# Patient Record
Sex: MALE | Race: WHITE | NOT HISPANIC OR LATINO | Employment: FULL TIME | URBAN - METROPOLITAN AREA
[De-identification: names, ages, dates, MRNs, and addresses within clinical notes are randomized per-mention and may not be internally consistent; named-entity substitution may affect disease eponyms.]

---

## 2018-05-16 ENCOUNTER — OFFICE VISIT (OUTPATIENT)
Dept: URGENT CARE | Facility: PHYSICIAN GROUP | Age: 23
End: 2018-05-16
Payer: COMMERCIAL

## 2018-05-16 VITALS
SYSTOLIC BLOOD PRESSURE: 118 MMHG | HEIGHT: 68 IN | HEART RATE: 75 BPM | OXYGEN SATURATION: 96 % | BODY MASS INDEX: 21.98 KG/M2 | WEIGHT: 145 LBS | TEMPERATURE: 98.2 F | DIASTOLIC BLOOD PRESSURE: 80 MMHG

## 2018-05-16 DIAGNOSIS — K64.4 EXTERNAL HEMORRHOID, BLEEDING: ICD-10-CM

## 2018-05-16 PROCEDURE — 99203 OFFICE O/P NEW LOW 30 MIN: CPT | Performed by: FAMILY MEDICINE

## 2018-05-16 RX ORDER — HYDROCORTISONE ACETATE 25 MG/1
25 SUPPOSITORY RECTAL EVERY 12 HOURS
Qty: 6 SUPPOSITORY | Refills: 1 | Status: SHIPPED | OUTPATIENT
Start: 2018-05-16

## 2018-05-17 NOTE — PROGRESS NOTES
"Subjective:      Pietro Thibodeaux is a 23 y.o. male who presents with Bloody Stools (x1 week)            HPI  1wk BRBPR with bowel movements. More blood recently and concerned. Intermittetn mild pain. +PMH hemorrhoid. No unwanted weight loss. No weakness or fatigue. No melena. No PMH cancer or hemophilia. No treatments tried.     Review of Systems   Constitutional: Negative for chills, fever, malaise/fatigue and weight loss.   Gastrointestinal: Negative for constipation.   Genitourinary: Negative for hematuria.   Skin: Negative for itching and rash.   Neurological:        Speech is clear. Patient is appropriate and cooperative.     .  Medications, Allergies, and current problem list reviewed today in Epic       Objective:     /80   Pulse 75   Temp 36.8 °C (98.2 °F)   Ht 1.727 m (5' 8\")   Wt 65.8 kg (145 lb)   SpO2 96%   BMI 22.05 kg/m²      Physical Exam   Constitutional: He appears well-developed and well-nourished. No distress.   HENT:   Head: Normocephalic and atraumatic.   Cardiovascular: Normal rate, regular rhythm and normal heart sounds.    No murmur heard.  Pulmonary/Chest: Effort normal and breath sounds normal. He has no wheezes.   Genitourinary:   Genitourinary Comments: Small external hemorrhoids. No active bleeding. No mass noted with GABRIELLE.    Musculoskeletal:   Speech is clear. Patient is appropriate and cooperative.     Neurological:   Speech is clear. Patient is appropriate and cooperative.                 Assessment/Plan:     1. External hemorrhoid, bleeding  hydrocortisone (ANUSOL-HC) 25 MG Suppos     Differential diagnosis, natural history, supportive care, and indications for immediate follow-up discussed at length.   .      "

## 2018-05-31 ASSESSMENT — ENCOUNTER SYMPTOMS
WEIGHT LOSS: 0
FEVER: 0
CONSTIPATION: 0
CHILLS: 0

## 2025-06-15 ENCOUNTER — OFFICE VISIT (OUTPATIENT)
Dept: URGENT CARE | Facility: PHYSICIAN GROUP | Age: 30
End: 2025-06-15
Payer: COMMERCIAL

## 2025-06-15 VITALS
WEIGHT: 150 LBS | BODY MASS INDEX: 22.73 KG/M2 | SYSTOLIC BLOOD PRESSURE: 100 MMHG | OXYGEN SATURATION: 97 % | HEART RATE: 96 BPM | HEIGHT: 68 IN | DIASTOLIC BLOOD PRESSURE: 80 MMHG | RESPIRATION RATE: 12 BRPM | TEMPERATURE: 97 F

## 2025-06-15 DIAGNOSIS — B96.89 ACUTE BACTERIAL SINUSITIS: Primary | ICD-10-CM

## 2025-06-15 DIAGNOSIS — J01.90 ACUTE BACTERIAL SINUSITIS: Primary | ICD-10-CM

## 2025-06-15 PROCEDURE — 99203 OFFICE O/P NEW LOW 30 MIN: CPT | Performed by: FAMILY MEDICINE

## 2025-06-15 PROCEDURE — 3079F DIAST BP 80-89 MM HG: CPT | Performed by: FAMILY MEDICINE

## 2025-06-15 PROCEDURE — 3074F SYST BP LT 130 MM HG: CPT | Performed by: FAMILY MEDICINE

## 2025-06-15 RX ORDER — AZITHROMYCIN 250 MG/1
TABLET, FILM COATED ORAL
Qty: 6 TABLET | Refills: 0 | Status: SHIPPED | OUTPATIENT
Start: 2025-06-15 | End: 2025-06-20

## 2025-06-15 NOTE — PROGRESS NOTES
"Chief Complaint:    Chief Complaint   Patient presents with    Cough     X1week     Laryngitis       History of Present Illness:    Persisting symptoms x 7 days, including nasal symptoms with purulent mucus from nose, post-nasal drainage, voice hoarseness, and having to cough/spit up purulent mucus. No fever. Not getting better and possibly getting worse. Tried OTC meds for symptoms.      Past Medical History:    Past Medical History[1]    Past Surgical History:    Past Surgical History[2]    Social History:    Social History     Socioeconomic History    Marital status: Single     Spouse name: Not on file    Number of children: Not on file    Years of education: Not on file    Highest education level: Not on file   Occupational History    Not on file   Tobacco Use    Smoking status: Never    Smokeless tobacco: Never   Substance and Sexual Activity    Alcohol use: Not on file    Drug use: Not on file    Sexual activity: Not on file   Other Topics Concern    Not on file   Social History Narrative    Not on file     Social Drivers of Health     Financial Resource Strain: Not on file   Food Insecurity: Not on file   Transportation Needs: Not on file   Physical Activity: Not on file   Stress: Not on file   Social Connections: Not on file   Intimate Partner Violence: Not on file   Housing Stability: Not on file     Family History:    History reviewed. No pertinent family history.    Medications:    Medications Ordered Prior to Encounter[3]    Allergies:    Allergies[4]      Vitals:    Vitals:    06/15/25 1230   BP: 100/80   Pulse: 96   Resp: 12   Temp: 36.1 °C (97 °F)   TempSrc: Temporal   SpO2: 97%   Weight: 68 kg (150 lb)   Height: 1.727 m (5' 8\")       Physical Exam:    Constitutional: Vital signs reviewed. Appears well-developed and well-nourished. Hoarse. No acute distress.   Eyes: Sclera white, conjunctivae clear.   ENT: External ears normal. External auditory canals normal without discharge. TMs translucent and " non-bulging. Hearing normal. Lips/teeth are normal. Oral mucosa pink and moist. Posterior pharynx: WNL.  Neck: Neck supple.   Cardiovascular: Regular rate and rhythm. No murmur.  Pulmonary/Chest: Respirations non-labored. Clear to auscultation bilaterally.  Musculoskeletal: Normal gait. No muscular atrophy or weakness.  Neurological: Alert and oriented to person, place, and time. Muscle tone normal. Coordination normal.   Skin: No rashes or lesions. Warm, dry, normal turgor.  Psychiatric: Normal mood and affect. Behavior is normal. Judgment and thought content normal.       Assessment / Plan & Medical Decision Makin. Acute bacterial sinusitis (Primary)  - azithromycin (ZITHROMAX) 250 MG Tab; 2 tabs by mouth on day 1, 1 tab on days 2-5.  Dispense: 6 Tablet; Refill: 0       Discussed with him DDX, management options, and risks, benefits, and alternatives to treatment plan agreed upon.    Persisting symptoms x 7 days, including nasal symptoms with purulent mucus from nose, post-nasal drainage, voice hoarseness, and having to cough/spit up purulent mucus. No fever. Not getting better and possibly getting worse. Tried OTC meds for symptoms.    Hoarse.    May use over-the-counter meds for symptoms as needed.     Agreeable to medication prescribed.    Discussed expected course of duration, time for improvement, and to seek follow-up in Emergency Room, urgent care, or with PCP if getting worse at any time or not improving within expected time frame.        [1] History reviewed. No pertinent past medical history.  [2] History reviewed. No pertinent surgical history.  [3]   No current outpatient medications on file prior to visit.     No current facility-administered medications on file prior to visit.   [4] No Known Allergies